# Patient Record
(demographics unavailable — no encounter records)

---

## 2025-03-10 NOTE — NUR
SLIGHT DOT OF BLOOD ON ENRRIQUE; OTHERWISE SITE SOFT NON-TENDER WITH NO
HEMAOTMA AND INTACT DRESSING. DISCHARGE INSTRUCTIONS REVIEWED ALL QUESTIONS
ANSWERED.

## 2025-03-10 NOTE — NUR
PT RETURNED TO RECOVERY ROOM IN BED. RIGHT POP VEIN SITE SOFT NON-TENDER WITH
NO HEMATOMA, SLIGHT TRACK OOZING AND INTACT  DRESSING. PT SITTING UP CALL
LIGHT IN REACH. PT DRINKING WATER.

## 2025-03-10 NOTE — NUR
R POP DRESSING REMOVED AND REPLACED WITH ENRRIQUE AND CLEAR DRESSING NO
HEMATOMA, NO BLEEDING NOTED.